# Patient Record
Sex: FEMALE | Race: WHITE | NOT HISPANIC OR LATINO | Employment: FULL TIME | URBAN - NONMETROPOLITAN AREA
[De-identification: names, ages, dates, MRNs, and addresses within clinical notes are randomized per-mention and may not be internally consistent; named-entity substitution may affect disease eponyms.]

---
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
## 2021-07-06 ENCOUNTER — HOSPITAL ENCOUNTER (EMERGENCY)
Facility: MEDICAL CENTER | Age: 21
End: 2021-07-06
Attending: EMERGENCY MEDICINE
Payer: COMMERCIAL

## 2021-07-06 VITALS
TEMPERATURE: 98.3 F | HEIGHT: 64 IN | HEART RATE: 90 BPM | OXYGEN SATURATION: 95 % | WEIGHT: 161.6 LBS | RESPIRATION RATE: 18 BRPM | DIASTOLIC BLOOD PRESSURE: 69 MMHG | BODY MASS INDEX: 27.59 KG/M2 | SYSTOLIC BLOOD PRESSURE: 132 MMHG

## 2021-07-06 DIAGNOSIS — J02.9 PHARYNGITIS, UNSPECIFIED ETIOLOGY: ICD-10-CM

## 2021-07-06 LAB — S PYO DNA SPEC NAA+PROBE: NOT DETECTED

## 2021-07-06 PROCEDURE — 99283 EMERGENCY DEPT VISIT LOW MDM: CPT

## 2021-07-06 PROCEDURE — 87651 STREP A DNA AMP PROBE: CPT

## 2021-07-06 PROCEDURE — 700102 HCHG RX REV CODE 250 W/ 637 OVERRIDE(OP): Performed by: EMERGENCY MEDICINE

## 2021-07-06 PROCEDURE — A9270 NON-COVERED ITEM OR SERVICE: HCPCS | Performed by: EMERGENCY MEDICINE

## 2021-07-06 RX ORDER — HYDROCODONE BITARTRATE AND ACETAMINOPHEN 5; 325 MG/1; MG/1
2 TABLET ORAL ONCE
Status: COMPLETED | OUTPATIENT
Start: 2021-07-06 | End: 2021-07-06

## 2021-07-06 RX ORDER — IBUPROFEN 600 MG/1
600 TABLET ORAL ONCE
Status: COMPLETED | OUTPATIENT
Start: 2021-07-06 | End: 2021-07-06

## 2021-07-06 RX ADMIN — IBUPROFEN 600 MG: 600 TABLET ORAL at 02:17

## 2021-07-06 RX ADMIN — HYDROCODONE BITARTRATE AND ACETAMINOPHEN 2 TABLET: 5; 325 TABLET ORAL at 02:17

## 2021-07-06 ASSESSMENT — PAIN DESCRIPTION - PAIN TYPE
TYPE: ACUTE PAIN

## 2021-07-06 NOTE — ED NOTES
Discharge instructions reviewed with patient. AVS signed by patient. No PIV present. No new prescriptions. Patient understands need for follow-up appointment with primary care team and to return to ED for worsening symptoms. All questions answered at this time. Patient ambulated to exit with belongings & visitor. Patient in stable condition with no signs of distress. Patient agreeable to discharge instructions.

## 2021-07-06 NOTE — ED TRIAGE NOTES
Pt here for sore throat since Friday. States fever of 100.5 at home; took a herbal tea at home to coat throat for pain and Robitussin at approx. 2300. No n/v/d. No sob or cp. No other complaints. First COVID vaccine  06/28/21. Med hx: denies

## 2021-07-06 NOTE — ED PROVIDER NOTES
"ED Provider Note    CHIEF COMPLAINT  Chief Complaint   Patient presents with   • Sore Throat       HPI  Rayna Ren is a 21 y.o. female who presents for evaluation of sore throat since Friday.  Patient states it is gradually worsened interfering with her sleep.  She notes painful swallowing but appears to be phonating normally.  She is able to tolerate fluids.  She notes fevers at home but no significant neck pain or stiffness.  She has had no chest pain, shortness of breath, abdominal pain, nausea, or vomiting.  She has no ear pain or sinus pain.  She notes a mild runny nose which just started today.    REVIEW OF SYSTEMS  Constitutional: Fevers noted.  Skin: No rashes  HEENT: No trouble speaking.   Neck: No neck pain, stiffness, or masses.  Chest: No pain or rashes  Pulm: No shortness of breath, cough, wheezing, stridor, or pain with inspiration/expiration  Gastrointestinal: No nausea, vomiting, diarrhea, or abdominal pain.      PAST MEDICAL HISTORY   No significant past medical history    SOCIAL HISTORY  Social History     Tobacco Use   • Smoking status: Never Smoker   • Smokeless tobacco: Never Used   Vaping Use   • Vaping Use: Never used   Substance and Sexual Activity   • Alcohol use: Not Currently   • Drug use: No   • Sexual activity: Yes     Partners: Male       SURGICAL HISTORY   has a past surgical history that includes knee reconstruction.    CURRENT MEDICATIONS  Home Medications    **Home medications have not yet been reviewed for this encounter**         ALLERGIES  Allergies   Allergen Reactions   • Cat Hair Extract Hives       PHYSICAL EXAM  VITAL SIGNS: /69   Pulse 90   Temp 36.8 °C (98.3 °F) (Temporal)   Resp 18   Ht 1.626 m (5' 4\")   Wt 73.3 kg (161 lb 9.6 oz)   LMP 07/05/2021 (Exact Date)   SpO2 95%   Breastfeeding No   BMI 27.74 kg/m²    Gen: Alert in no apparent distress.  HEENT: No signs of trauma, Bilateral external ears normal, TMs pearly with good light reflex bilaterally, nose " normal. Conjunctiva normal, Non-icteric.  Mild posterior pharynx erythema, no unilateral swelling or asymmetry.  Uvula is not deviated or edematous.  There are no palatal petechiae.  Patient appears to be phonating normally and tolerating her own secretions.  Neck:  No tenderness, Supple, No masses  Lymphatic: No significant cervical lymphadenopathy noted.   Cardiovascular: Mild tachycardia with regular rhythm.   Thorax & Lungs: Normal breath sounds, No respiratory distress, No wheezing bilateral chest rise  Skin: Warm, [place].     LABS  Results for orders placed or performed during the hospital encounter of 07/06/21   Group A Strep by PCR    Specimen: Throat   Result Value Ref Range    Group A Strep by PCR Not Detected Not Detected       COURSE & MEDICAL DECISION MAKING  Patient arrives for evaluation of what appears to be pharyngitis but not of the strep variety.  She does not appear septic or toxic and has no findings to suggest peritonsillar abscess or parapharyngeal spread of any infection.  She does not have submandibular swelling or tongue elevation to suggest Jace's angina.  She has no problems tolerating fluids and is phonating normally argue against epiglottitis.  At this point I feel she is safe for symptomatic treatment at home and she is feeling much better after empiric treatment in the emergency department.  She will return if her symptoms worsen or change in any way and otherwise follow-up with her primary care physician if symptoms persist    FINAL IMPRESSION  1. Pharyngitis, unspecified etiology        Electronically signed by: Hilton Timmons M.D., 7/6/2021 1:51 AM

## 2025-07-13 NOTE — ASSESSMENT & PLAN NOTE
63.5 Patient was injured during basket ball season.patient had full repair of meniscus. Played [employer] ball this last year. Plans on Basket ball this year.